# Patient Record
Sex: MALE | Race: WHITE | Employment: FULL TIME | URBAN - METROPOLITAN AREA
[De-identification: names, ages, dates, MRNs, and addresses within clinical notes are randomized per-mention and may not be internally consistent; named-entity substitution may affect disease eponyms.]

---

## 2017-12-26 DIAGNOSIS — F51.01 PRIMARY INSOMNIA: ICD-10-CM

## 2017-12-29 RX ORDER — TRAZODONE HYDROCHLORIDE 50 MG/1
50 TABLET, FILM COATED ORAL
Qty: 30 TABLET | Refills: 0 | Status: SHIPPED | OUTPATIENT
Start: 2017-12-29 | End: 2018-04-10

## 2018-02-08 DIAGNOSIS — F51.01 PRIMARY INSOMNIA: ICD-10-CM

## 2018-02-08 RX ORDER — TRAZODONE HYDROCHLORIDE 50 MG/1
50 TABLET, FILM COATED ORAL
Qty: 30 TABLET | Refills: 0 | Status: CANCELLED | OUTPATIENT
Start: 2018-02-08

## 2018-02-08 NOTE — TELEPHONE ENCOUNTER
Pharmacy does not show the refill on their system so I am hoping this was filled by another provider as patient now lives in [place].    Calli Pacheco RN

## 2018-02-08 NOTE — TELEPHONE ENCOUNTER
"Requested Prescriptions   Pending Prescriptions Disp Refills     traZODone (DESYREL) 50 MG tablet  Last Written Prescription Date:  12/29/17  Last Fill Quantity: 30,  # refills: 0   Last Office Visit with [facility] [place]:  12/08/15   Future Office Visit:      30 tablet 0     Sig: Take 1 tablet (50 mg) by mouth nightly as needed for sleep (Needs follow-up appointment for this medication)    Serotonin Modulators Failed    2/8/2018  9:19 AM       Failed - Recent or future visit with authorizing provider's specialty    Patient had office visit in the last year or has a visit in the next 30 days with authorizing provider.  See \"Patient Info\" tab in inbasket, or \"Choose Columns\" in Meds & Orders section of the refill encounter.            Passed - Patient is age 18 or older          "

## 2018-04-10 ENCOUNTER — TELEPHONE (OUTPATIENT)
Dept: FAMILY MEDICINE | Facility: CLINIC | Age: 63
End: 2018-04-10

## 2018-04-10 DIAGNOSIS — F51.01 PRIMARY INSOMNIA: ICD-10-CM

## 2018-04-10 DIAGNOSIS — I10 ESSENTIAL HYPERTENSION WITH GOAL BLOOD PRESSURE LESS THAN 140/90: ICD-10-CM

## 2018-04-10 RX ORDER — AMLODIPINE BESYLATE 10 MG/1
10 TABLET ORAL DAILY
Qty: 30 TABLET | Refills: 0 | Status: SHIPPED | OUTPATIENT
Start: 2018-04-10 | End: 2018-04-10

## 2018-04-10 RX ORDER — TRAZODONE HYDROCHLORIDE 50 MG/1
50 TABLET, FILM COATED ORAL
Qty: 30 TABLET | Refills: 0 | Status: SHIPPED | OUTPATIENT
Start: 2018-04-10 | End: 2018-04-10

## 2018-04-10 NOTE — TELEPHONE ENCOUNTER
Left a message for pt to call clinic to discuss.  Need to let pt know that these were refilled and where the prescriptions were sent.    Sheeba CAMPBELL RN

## 2018-04-10 NOTE — TELEPHONE ENCOUNTER
[facility] pharmacy in [place] calling to let us know that insurance won't fill these as prescriber isn't covered.    We can call them with another name to put it under at [phone].    [facility]

## 2018-04-10 NOTE — TELEPHONE ENCOUNTER
Dr Sharma, do you want to resend these two meds, (please see notes below) as they are saying Diana is not a covered prescriber.   Sandra Duran RNC

## 2018-04-10 NOTE — TELEPHONE ENCOUNTER
Tried to speak with pt twice and each time he says hello, and then call is disconnected.    [phone] is the number.    Pt returning call and given message.    [facility]

## 2018-04-10 NOTE — TELEPHONE ENCOUNTER
Please see pt request for medications below.  Has moved to [place] and is establishing with a new provider.      Amlodipine 10 mg     Last Written Prescription Date:  12/8/2016  Last Fill Quantity: 90,   # refills: 3  Last Office Visit: 8/18/16  Future Office visit:  NA     Routing refill request to provider for review/approval because:  Outside of protocol parameters.    Trazdone 50 mg   Last Written Prescription Date:  12/29/2017  Last Fill Quantity: 30,   # refills: 0  Last Office Visit: 8/18/16   Future Office visit:   NA    Routing refill request to provider for review/approval because:  Outside of parameters.

## 2018-04-10 NOTE — TELEPHONE ENCOUNTER
Reason for Call:  Other prescription    Detailed comments: pt calling for refills on amLODIPine (NORVASC) 10 MG tablet and traZODone (DESYREL) 50 MG tablet. Pt states he is in the process of setting up at a new clinic in [place] but is almost out of medication.     Please let him know that it's at the [facility] with the address of [address] [place] [address]. I didn't find one on [address]. Unless he has a phone we could look it up with.    Phone Number Patient can be reached at: [phone]  Best Time: any    Can we leave a detailed message on this number? YES    Call taken on 4/10/2018 at 9:23 AM by Deanna Michelle

## 2018-04-11 RX ORDER — TRAZODONE HYDROCHLORIDE 50 MG/1
50 TABLET, FILM COATED ORAL
Qty: 30 TABLET | Refills: 0 | Status: SHIPPED | OUTPATIENT
Start: 2018-04-11

## 2018-04-11 RX ORDER — AMLODIPINE BESYLATE 10 MG/1
10 TABLET ORAL DAILY
Qty: 30 TABLET | Refills: 0 | Status: SHIPPED | OUTPATIENT
Start: 2018-04-11

## 2018-04-11 NOTE — TELEPHONE ENCOUNTER
Left message for patient to return call to clinic.  Medications were sent as requested.   Nasreen Meza RN

## 2018-04-12 NOTE — TELEPHONE ENCOUNTER
"Spoke with Ariana at [facility] in [place].  Rx's are on file there - pt did not  amlodipine but did  a \"few days\" of the trazodone.    A local [place] clinic will be sending in new scripts to the [facility] for this pt.    Tracey YOST RN      "

## 2018-06-16 DIAGNOSIS — F51.01 PRIMARY INSOMNIA: ICD-10-CM

## 2018-06-18 NOTE — TELEPHONE ENCOUNTER
"Requested Prescriptions   Pending Prescriptions Disp Refills     traZODone (DESYREL) 50 MG tablet [Pharmacy Med Name: TRAZODONE 50MG TABLETS]  Last Written Prescription Date:  04/11/18  Last Fill Quantity: 30,  # refills: 0   Last office visit: 12/8/2016 with prescribing provider:  12/08/16   Future Office Visit:     30 tablet 0     Sig: TAKE 1 TABLET(50 MG) BY MOUTH EVERY NIGHT AS NEEDED FOR SLEEP    Serotonin Modulators Failed    6/16/2018  4:26 PM       Failed - Recent (12 mo) or future (30 days) visit within the authorizing provider's specialty    Patient had office visit in the last 12 months or has a visit in the next 30 days with authorizing provider or within the authorizing provider's specialty.  See \"Patient Info\" tab in inbasket, or \"Choose Columns\" in Meds & Orders section of the refill encounter.           Passed - Patient is age 18 or older        "

## 2018-06-19 RX ORDER — TRAZODONE HYDROCHLORIDE 50 MG/1
TABLET, FILM COATED ORAL
Qty: 30 TABLET | Refills: 0 | OUTPATIENT
Start: 2018-06-19

## 2018-06-19 NOTE — TELEPHONE ENCOUNTER
Pt no longer lives in [place] and is permanently in [place] now, all medical being done through Medicaid.  Has an appt today with a new provider.    Tracey YOST RN

## 2018-06-19 NOTE — TELEPHONE ENCOUNTER
I have attempted to contact this patient by phone with the following results: left message to return my call on answering machine, left on listed home phone of [phone]. Cell [phone] and [phone] are out of service.  Has not had an OV since 12/8/2016 - needs appt.    Tracey YOST RN

## 2021-01-28 NOTE — TELEPHONE ENCOUNTER
Called pharmacy to see if patient picked up medications-pharmacy not open yet.  Nasreen Meza RN     Periorbital Skin Units: 9